# Patient Record
Sex: FEMALE | Race: OTHER | HISPANIC OR LATINO | URBAN - METROPOLITAN AREA
[De-identification: names, ages, dates, MRNs, and addresses within clinical notes are randomized per-mention and may not be internally consistent; named-entity substitution may affect disease eponyms.]

---

## 2019-05-04 ENCOUNTER — EMERGENCY (EMERGENCY)
Facility: HOSPITAL | Age: 39
LOS: 1 days | Discharge: HOME | End: 2019-05-04
Admitting: EMERGENCY MEDICINE
Payer: SELF-PAY

## 2019-05-04 VITALS
DIASTOLIC BLOOD PRESSURE: 87 MMHG | HEART RATE: 78 BPM | TEMPERATURE: 98 F | RESPIRATION RATE: 18 BRPM | SYSTOLIC BLOOD PRESSURE: 129 MMHG | OXYGEN SATURATION: 100 %

## 2019-05-04 PROCEDURE — 99282 EMERGENCY DEPT VISIT SF MDM: CPT | Mod: 25

## 2019-05-04 PROCEDURE — 12001 RPR S/N/AX/GEN/TRNK 2.5CM/<: CPT | Mod: FA

## 2019-05-04 RX ORDER — TETANUS TOXOID, REDUCED DIPHTHERIA TOXOID AND ACELLULAR PERTUSSIS VACCINE, ADSORBED 5; 2.5; 8; 8; 2.5 [IU]/.5ML; [IU]/.5ML; UG/.5ML; UG/.5ML; UG/.5ML
0.5 SUSPENSION INTRAMUSCULAR ONCE
Qty: 0 | Refills: 0 | Status: COMPLETED | OUTPATIENT
Start: 2019-05-04 | End: 2019-05-04

## 2019-05-04 RX ADMIN — TETANUS TOXOID, REDUCED DIPHTHERIA TOXOID AND ACELLULAR PERTUSSIS VACCINE, ADSORBED 0.5 MILLILITER(S): 5; 2.5; 8; 8; 2.5 SUSPENSION INTRAMUSCULAR at 14:28

## 2019-05-04 NOTE — ED PROVIDER NOTE - OBJECTIVE STATEMENT
40 y/o female no pmh c/o finger laceration x today. Pt admits to cutting food at home and cut her left 1st finger. Pt admits to bleeding. Denies numbness, tingling, weakness, fever or chills. tdap not up to date.

## 2019-05-04 NOTE — ED PROVIDER NOTE - NSFOLLOWUPINSTRUCTIONS_ED_ALL_ED_FT
Please follow up with the ER in 10-14 days for suture removal.  Please clean wound daily with soap and water.  Please cover with bacitracin and gauze for 3-4 days then leave uncovered.

## 2019-05-19 ENCOUNTER — EMERGENCY (EMERGENCY)
Facility: HOSPITAL | Age: 39
LOS: 1 days | Discharge: HOME | End: 2019-05-19
Admitting: EMERGENCY MEDICINE
Payer: SELF-PAY

## 2019-05-19 VITALS
DIASTOLIC BLOOD PRESSURE: 87 MMHG | SYSTOLIC BLOOD PRESSURE: 122 MMHG | RESPIRATION RATE: 16 BRPM | OXYGEN SATURATION: 100 % | TEMPERATURE: 99 F | HEART RATE: 78 BPM

## 2019-05-19 PROCEDURE — 99281 EMR DPT VST MAYX REQ PHY/QHP: CPT

## 2019-05-19 NOTE — ED PROVIDER NOTE - NSFOLLOWUPINSTRUCTIONS_ED_ALL_ED_FT
Advance activity as tolerated.  Continue all previously prescribed medications as directed unless otherwise instructed.  Follow up with your primary care physician in 48-72 hours- bring copies of your results.  Return to the ER for worsening or persistent symptoms, and/or ANY NEW OR CONCERNING SYMPTOMS. If you have issues obtaining follow up, please call: [phone] to obtain a doctor or specialist who takes your insurance in your area.  You may call [phone] to make an appointment with the internal medicine clinic.

## 2019-05-19 NOTE — ED PROVIDER NOTE - PROGRESS NOTE DETAILS
Dr Vizcarra: This patient was not seen by me nor discussed with me. I was available for consultation from the PA/NP but was not approached. I signed the chart per hospital policy.

## 2019-05-19 NOTE — ED PROVIDER NOTE - PLAN OF CARE
Advance activity as tolerated.  Continue all previously prescribed medications as directed unless otherwise instructed.  Follow up with your primary care physician in 48-72 hours- bring copies of your results.  Return to the ER for worsening or persistent symptoms, and/or ANY NEW OR CONCERNING SYMPTOMS. If you have issues obtaining follow up, please call: [phone] to obtain a doctor or specialist who takes your insurance in your area.  You may call [phone] to make an appointment with the internal medicine clinic.

## 2019-05-19 NOTE — ED PROVIDER NOTE - CARE PLAN
Principal Discharge DX:	Encounter for removal of sutures  Assessment and plan of treatment:	Advance activity as tolerated.  Continue all previously prescribed medications as directed unless otherwise instructed.  Follow up with your primary care physician in 48-72 hours- bring copies of your results.  Return to the ER for worsening or persistent symptoms, and/or ANY NEW OR CONCERNING SYMPTOMS. If you have issues obtaining follow up, please call: [phone] to obtain a doctor or specialist who takes your insurance in your area.  You may call [phone] to make an appointment with the internal medicine clinic.

## 2019-05-19 NOTE — ED PROVIDER NOTE - SKIN WOUND TYPE
5 simple interrupted sutures on the anterior surface of the L thumb, skin well healed, no erythema or drainage from wound

## 2019-05-19 NOTE — ED PROVIDER NOTE - CLINICAL SUMMARY MEDICAL DECISION MAKING FREE TEXT BOX
A:  -Suture removal  P:  -5 sutures removed by me, no sequela, use Bacitracin ointment to promote wound healing.

## 2019-05-19 NOTE — ED PROVIDER NOTE - OBJECTIVE STATEMENT
40yo F presents to the ED for suture removal. Pt had laceration of the L thumb ~2 weeks ago, repaired here, 6 interrupted sutures placed, 1 fell out today. No other complaints at this time.

## 2019-05-20 PROBLEM — Z78.9 OTHER SPECIFIED HEALTH STATUS: Chronic | Status: ACTIVE | Noted: 2019-05-06

## 2024-05-02 NOTE — ED PROVIDER NOTE - CPE EDP EYES NORM
Products Recommended: For cosmetic elegance on face, recommended EltaMD, LaRoche-Posay, Cotz, Colorescience, Alastin, Tizo. General Sunscreen Counseling: I recommended a physical (zinc or titanium based) water-resistant broad spectrum sunscreen with a SPF of 30 or higher and should be re-applied every 2-3 hours; pt also counseled on wearing hats and sun protective clothing with UPF. \\n\\nTold to call office if notices any new lesions of concern or changing lesions (new symptoms, change in size/symmetry) Detail Level: Detailed normal...